# Patient Record
Sex: MALE
[De-identification: names, ages, dates, MRNs, and addresses within clinical notes are randomized per-mention and may not be internally consistent; named-entity substitution may affect disease eponyms.]

---

## 2019-01-18 ENCOUNTER — HOSPITAL ENCOUNTER (EMERGENCY)
Dept: HOSPITAL 42 - ED | Age: 39
Discharge: HOME | End: 2019-01-18
Payer: COMMERCIAL

## 2019-01-18 VITALS — OXYGEN SATURATION: 98 % | SYSTOLIC BLOOD PRESSURE: 124 MMHG | HEART RATE: 76 BPM | DIASTOLIC BLOOD PRESSURE: 73 MMHG

## 2019-01-18 VITALS — RESPIRATION RATE: 18 BRPM | TEMPERATURE: 99.4 F

## 2019-01-18 VITALS — BODY MASS INDEX: 43.8 KG/M2

## 2019-01-18 DIAGNOSIS — J45.909: Primary | ICD-10-CM

## 2019-01-18 NOTE — ED PDOC
Arrival/HPI





- General


Chief Complaint: Shortness Of Breath


Time Seen by Provider: 01/18/19 10:37


Historian: Patient





- History of Present Illness


Narrative History of Present Illness (Text): 





01/18/19 11:50


37yo morbidly obese male with pmhx of Asthma who present with complaint of chest

congestion, nonproductive and SOB x 3days. States the cough improved and he ran 

out of his albuterol last night. Came to ED for SOB which is typical of his 

previous Asthma symptoms. He states he had subjective fever the past two days. 

Denies chest pain, diaphoresis, night sweats, sick contact, travel. He is not 

steroid dependent, never admitted or intubated secondary to Asthma.





Past Medical History





- Provider Review


Nursing Documentation Reviewed: Yes





- Infectious Disease


Hx of Infectious Diseases: None





- Cardiac


Hx Cardiac Disorders: No





- Pulmonary


Hx Respiratory Disorders: Yes


Hx Asthma: Yes





- Neurological


Hx Neurological Disorder: No





- HEENT


Hx HEENT Disorder: No





- Renal


Hx Renal Disorder: No





- Endocrine/Metabolic


Hx Endocrine Disorders: No





- Hematological/Oncological


Hx Blood Disorders: No





- Integumentary


Hx Dermatological Disorder: No





- Musculoskeletal/Rheumatological


Hx Musculoskeletal Disorders: No





- Gastrointestinal


Hx Gastrointestinal Disorders: No





- Genitourinary/Gynecological


Hx Genitourinary Disorders: No





- Psychiatric


Hx Psychophysiologic Disorder: No


Hx Substance Use: Yes (marijuana daily)





- Surgical History


Hx Appendectomy: Yes





- Anesthesia


Hx Anesthesia: Yes


Hx Anesthesia Reactions: No


Hx Malignant Hyperthermia: No





Family/Social History





- Physician Review


Nursing Documentation Reviewed: Yes


Family/Social History: Unknown Family HX


Smoking Status: Never Smoked


Hx Alcohol Use: Yes


Frequency of alcohol use: Socially


Hx Substance Use: Yes (marijuana daily)





Allergies/Home Meds


Allergies/Adverse Reactions: 


Allergies





avocado Allergy (Verified 01/18/19 10:31)


   RASH


kiwi Allergy (Verified 01/18/19 10:31)


   RASH











Review of Systems





- Physician Review


All systems were reviewed & negative as marked: Yes





- Review of Systems


Constitutional: Normal


Eyes: Normal


ENT: Normal


Respiratory: SOB, Cough.  absent: Sputum, Wheezing


Cardiovascular: Normal


Gastrointestinal: Normal


Genitourinary Male: Normal


Musculoskeletal: Normal


Skin: Normal


Neurological: Normal


Endocrine: Normal


Hemo/Lymphatic: Normal


Psychiatric: Normal





Physical Exam


Vital Signs Reviewed: Yes





Vital Signs











  Temp Pulse Resp BP Pulse Ox


 


 01/18/19 10:37    18   97


 


 01/18/19 10:32  99.4 F  68  18  128/67  96











Temperature: Afebrile


Blood Pressure: Normal


Pulse: Regular


Respiratory Rate: Normal


Appearance: Positive for: Well-Appearing, Non-Toxic, Comfortable


Pain Distress: None


Mental Status: Positive for: Alert and Oriented X 3





- Systems Exam


Head: Present: Atraumatic, Normocephalic


Pupils: Present: PERRL


Extroacular Muscles: Present: EOMI


Conjunctiva: Present: Normal


Mouth: Present: Moist Mucous Membranes


Neck: Present: Normal Range of Motion


Respiratory/Chest: Present: Clear to Auscultation, Good Air Exchange, Wheezes 

(Mild expiratory wheezze at the bases).  No: Respiratory Distress, Accessory 

Muscle Use


Cardiovascular: Present: Regular Rate and Rhythm, Normal S1, S2.  No: Murmurs


Abdomen: No: Tenderness, Distention, Peritoneal Signs


Back: Present: Normal Inspection


Upper Extremity: Present: Normal Inspection.  No: Cyanosis, Edema


Lower Extremity: Present: Normal Inspection.  No: Edema


Neurological: Present: GCS=15, CN II-XII Intact, Speech Normal


Skin: Present: Warm, Dry, Normal Color.  No: Rashes


Psychiatric: Present: Alert, Oriented x 3, Normal Insight, Normal Concentration





Medical Decision Making


ED Course and Treatment: 





01/18/19 12:05


PT present to ED for stated history. He was not hypoxic and in no distress. He 

had mild expiratory wheeze at the bases





On re evaluation s/p treatment his lung was CTA b/l. 





Rapid flu was negative





Per the xray tech he declined Chest xray





He was ambulatory and talking in full sentence without any distress.





He was DC home with prednisone/albuterol. Referred to the clinic





- Medication Orders


Current Medication Orders: 














Discontinued Medications





Albuterol/Ipratropium (Duoneb 3 Mg/0.5 Mg (3 Ml) Ud)  3 ml IH Q15M STA


   Stop: 01/18/19 10:49


   Last Admin: 01/18/19 10:45  Dose: 3 ml





Prednisone (Prednisone Tab)  60 mg PO STAT ONE


   Stop: 01/18/19 10:49


   Last Admin: 01/18/19 10:59  Dose: 60 mg











Disposition/Present on Arrival





- Present on Arrival


Any Indicators Present on Arrival: No


History of DVT/PE: No


History of Uncontrolled Diabetes: No


Urinary Catheter: No


History of Decub. Ulcer: No


History Surgical Site Infection Following: None





- Disposition


Have Diagnosis and Disposition been Completed?: Yes


Diagnosis: 


 Asthma attack





Disposition: HOME/ ROUTINE


Disposition Time: 11:55


Patient Plan: Discharge


Patient Problems: 


                             Current Active Problems











Problem Status Onset


 


Asthma attack Acute 











Condition: STABLE


Discharge Instructions (ExitCare):  Asthma in Adults


Additional Instructions: 


Follow up with your Doctor


return to ED for any new or worsening symptoms


Prescriptions: 


Albuterol HFA [Ventolin HFA 90 mcg/actuation (8 g)] 2 puff IH [id number] #1 puff


Albuterol 0.083% [Albuterol 0.083% Inhal Sol (2.5 mg/3 ml) UD] 2.5 mg IH Q6 #1 

neb


predniSONE [Prednisone] 20 mg PO BID #6 tab


Referrals: 


PCP,NO [Primary Care Provider] - Follow up with primary


Yolette Sams MD [Medical Doctor] - Follow up with primary


Forms:  [employer] (English)